# Patient Record
(demographics unavailable — no encounter records)

---

## 2025-01-29 NOTE — PHYSICAL EXAM
[1+] : 1+ [Normal Gait and Station] : normal gait and station [Normal muscle strength, symmetry and tone of facial, head and neck musculature] : normal muscle strength, symmetry and tone of facial, head and neck musculature [Normal] : no cervical lymphadenopathy [Exposed Vessel] : left anterior vessel not exposed [Increased Work of Breathing] : no increased work of breathing with use of accessory muscles and retractions [de-identified] :  tympanic membrane intact with mucoid fluid [de-identified] :  tympanic membrane intact with mucoid fluid [de-identified] : septal deviation blocking right nasal passages  [de-identified] : uvula midline, no palatal fullness

## 2025-01-29 NOTE — HISTORY OF PRESENT ILLNESS
[No Personal or Family History of Easy Bruising, Bleeding, or Issues with General Anesthesia] : No Personal or Family History of easy bruising, bleeding, or issues with general anesthesia [de-identified] : Paul is a 15 year old here for hospital follow up after peritonsillar and retropharyngeal abscess.  Here with mom who provides history.   Recently hospitalized at [facility] 1/20/25 for transoral I&D of left PTA and RPA.  Discharged on 1/25/25 on Augmentin. Has been taking antibiotics as prescribed.  Back to baseline. No longer with throat pain. No muffled voice or trismus.  Full neck mobility.  This is the first time he has had a tonsillar abscess or throat abscess.  Mom reports some strep infections in the past - no more than 1-2 per year.   Mom also reports constant nasal congestion.  No snoring.  He has had allergy testing.  Has tried Flonase intermittently in the past.  Has used nasal saline as needed in the past.  Sometimes with epistaxis, worse in the winter. Usually self-resolving with pressure.   Was seen by outside ENT for possible cauliflower ear from wrestling. This has completely resolved.  Feels he does have muffled hearing recently.  No recent ear infections.  No other otolaryngology concerns today.

## 2025-01-29 NOTE — ASSESSMENT
[FreeTextEntry1] : Recovering well s/p I&D of PTA and RPA. Recommend continued antibiotics to completion. We discussed that if he were to have another PTA would consider tonsillectomy.   Bilateral middle ear effusions today. We will monitor for clearance. Follow up in 3 months for repeat ear evaluation to ensure resolution.   We discussed that he does have deviated septum which may be contributing to nasal congestion. We discussed that typically we wait until past puberty before addressing deviated septum with surgery, however something to consider for the future. I discussed more consistent use of Flonase daily for chronic rhinitis. We also discussed use of nasal saline and ayr gel to keep the nose moist and prevent epistaxis.

## 2025-01-29 NOTE — REVIEW OF SYSTEMS
[Negative] : Heme/Lymph [de-identified] : as per HPI [de-identified] : as per HPI [de-identified] : as per HPI

## 2025-01-29 NOTE — REASON FOR VISIT
[Initial Evaluation] : an initial evaluation for [Patient] : patient [Mother] : mother [FreeTextEntry2] : hospital follow up I&D PTA, RPA

## 2025-01-29 NOTE — CONSULT LETTER
[Dear  ___] : Dear  [unfilled], [Courtesy Letter:] : I had the pleasure of seeing your patient, [unfilled], in my office today. [Sincerely,] : Sincerely, [FreeTextEntry3] : America Avila M.D. [place] [address] Tel [phone] Fax [phone]